# Patient Record
Sex: FEMALE | Race: WHITE | Employment: FULL TIME | URBAN - METROPOLITAN AREA
[De-identification: names, ages, dates, MRNs, and addresses within clinical notes are randomized per-mention and may not be internally consistent; named-entity substitution may affect disease eponyms.]

---

## 2022-08-19 ENCOUNTER — HOSPITAL ENCOUNTER (OUTPATIENT)
Dept: SLEEP CENTER | Age: 47
Discharge: HOME | End: 2022-08-19
Payer: COMMERCIAL

## 2022-08-19 DIAGNOSIS — R06.81 WITNESSED APNEIC SPELLS: ICD-10-CM

## 2022-08-19 DIAGNOSIS — R06.83 SNORES: Primary | ICD-10-CM

## 2022-08-19 DIAGNOSIS — R06.89 GASPING FOR BREATH: ICD-10-CM

## 2022-08-19 PROCEDURE — 95810 POLYSOM 6/> YRS 4/> PARAM: CPT

## 2022-08-20 VITALS — HEART RATE: 68 BPM | WEIGHT: 160 LBS | BODY MASS INDEX: 27.31 KG/M2 | HEIGHT: 64 IN | OXYGEN SATURATION: 97 %

## 2022-08-20 ASSESSMENT — SLEEP AND FATIGUE QUESTIONNAIRES
ESS TOTAL SCORE: 4
HOW LIKELY ARE YOU TO NOD OFF OR FALL ASLEEP WHILE SITTING AND READING: 1
HOW LIKELY ARE YOU TO NOD OFF OR FALL ASLEEP WHILE LYING DOWN TO REST IN THE AFTERNOON WHEN CIRCUMSTANCES PERMIT: 1
HOW LIKELY ARE YOU TO NOD OFF OR FALL ASLEEP WHILE SITTING INACTIVE IN A PUBLIC PLACE: 0
HOW LIKELY ARE YOU TO NOD OFF OR FALL ASLEEP WHILE SITTING AND TALKING TO SOMEONE: 0
HOW LIKELY ARE YOU TO NOD OFF OR FALL ASLEEP WHEN YOU ARE A PASSENGER IN A CAR FOR AN HOUR WITHOUT A BREAK: 1
HOW LIKELY ARE YOU TO NOD OFF OR FALL ASLEEP WHILE SITTING QUIETLY AFTER LUNCH WITHOUT ALCOHOL: 0
HOW LIKELY ARE YOU TO NOD OFF OR FALL ASLEEP IN A CAR, WHILE STOPPED FOR A FEW MINUTES IN TRAFFIC: 0
HOW LIKELY ARE YOU TO NOD OFF OR FALL ASLEEP WHILE WATCHING TV: 1

## 2022-08-24 NOTE — PROGRESS NOTES
[address] [address]                               SLEEP STUDY REPORT    PATIENT NAME: Nolan Randhawa                   :        1975  MED REC NO:   [id number]                            ROOM:  ACCOUNT NO:   [de-identified]                           ADMIT DATE: 2022  PROVIDER:     Paolo Ku MD    DATE OF STUDY:  2022    REFERRING PROVIDER:  Jorge Sanchez M.D.    STUDY PERFORMED:  Polysomnography. INDICATION FOR POLYSOMNOGRAPHY:  Wakes gasping, disruptive snoring,  bruxism, witnessed apnea, and restless sleep. CURRENT MEDICATIONS:  None listed. INTERPRETATION:  SLEEP ARCHITECTURE:  This patient had a total time in bed of 381  minutes. Total sleep time was 336 minutes. Sleep efficiency was 88%  and sleep latency was five minutes. REM latency was 42 minutes. SLEEP STAGING:  The patient was awake 12% of the time in bed. Stage N1  was 6% and N2 was 69% of the total sleep time. Slow wave sleep was  absent and stage REM sleep was 25% of the sleep time. RESPIRATION SUMMARY:  APNEA:  There were two apneic events which were both obstructive,  occurred during REM stage sleep and had a maximum duration of 14  seconds. HYPOPNEA:  There were 102 hypopneic events, 24 occurred during REM stage  sleep and maximum duration was 100 seconds. APNEA/HYPOPNEA INDEX:  The apnea/hypopnea index is 20. AROUSAL ANALYSIS:  There were 58 arousals/awakenings, the sleep  disruption index was normal.    LIMB MOVEMENT SUMMARY:  There were no limb movements. OXYGEN SATURATION:  Average oxygen saturation while awake was 96%. Lowest saturation was 86%. The patient spent 12% of the time with  saturation less than 90%. HEART RATE SUMMARY:  Average heart rate while awake was 76 beats per  minute.   Maximum heart rate during sleep was 101 beats per minute and  minimum heart rate during sleep was 51 beats per minute. There were no  ectopic beats noted. MISCELLANEOUS:  [place] Sleepiness Scale score is 4/24. Snoring was  mild. This was graded as 3 on a 1 to 10 scale. There was no apparent  bruxism. IMPRESSION:  1. Moderate obstructive sleep apnea. 2.  Mild nocturnal hypoxia. 3.  Mild snoring. 4.  No cardiac dysrhythmia. DISCUSSION:  This patient has an apnea/hypopnea index of 20. Normal is  less than five and mild is 5 to 15. Fifteen to thirty is considered  moderate, leaving this patient in the moderate category. Along with  this, there was mild nocturnal hypoxia and mild snoring. However, with  moderate disease, treatment is indicated. PLAN:  1. Auto-CPAP at 5 to 15 cm of water pressure. 2.  The patient to be seen in 6 to 10 weeks. 3.  When the patient is seen, if symptoms persist or CPAP download is  abnormal, the patient will be referred back to [facility] for positive airway pressure titration.         Venessa Rodrigues MD  [employer] of [employer]    D: 08/23/2022 18:26:23       T: 08/23/2022 18:28:51     [email or web address]  Job#: [id number]     Doc#: [id number]    CC: